# Patient Record
Sex: MALE | Race: WHITE
[De-identification: names, ages, dates, MRNs, and addresses within clinical notes are randomized per-mention and may not be internally consistent; named-entity substitution may affect disease eponyms.]

---

## 2019-01-16 ENCOUNTER — HOSPITAL ENCOUNTER (EMERGENCY)
Dept: HOSPITAL 97 - ER | Age: 44
Discharge: HOME | End: 2019-01-16
Payer: COMMERCIAL

## 2019-01-16 VITALS — OXYGEN SATURATION: 100 % | SYSTOLIC BLOOD PRESSURE: 127 MMHG | DIASTOLIC BLOOD PRESSURE: 90 MMHG | TEMPERATURE: 98.3 F

## 2019-01-16 DIAGNOSIS — H54.7: ICD-10-CM

## 2019-01-16 DIAGNOSIS — H53.9: Primary | ICD-10-CM

## 2019-01-16 LAB
BUN BLD-MCNC: 7 MG/DL (ref 7–18)
GLUCOSE SERPLBLD-MCNC: 96 MG/DL (ref 74–106)
HCT VFR BLD CALC: 43.4 % (ref 39.6–49)
LYMPHOCYTES # SPEC AUTO: 1.4 K/UL (ref 0.7–4.9)
PMV BLD: 9 FL (ref 7.6–11.3)
POTASSIUM SERPL-SCNC: 3.6 MMOL/L (ref 3.5–5.1)
RBC # BLD: 5.13 M/UL (ref 4.33–5.43)

## 2019-01-16 PROCEDURE — 99285 EMERGENCY DEPT VISIT HI MDM: CPT

## 2019-01-16 PROCEDURE — 93880 EXTRACRANIAL BILAT STUDY: CPT

## 2019-01-16 PROCEDURE — 80048 BASIC METABOLIC PNL TOTAL CA: CPT

## 2019-01-16 PROCEDURE — 81003 URINALYSIS AUTO W/O SCOPE: CPT

## 2019-01-16 PROCEDURE — 36415 COLL VENOUS BLD VENIPUNCTURE: CPT

## 2019-01-16 PROCEDURE — 70450 CT HEAD/BRAIN W/O DYE: CPT

## 2019-01-16 PROCEDURE — 82962 GLUCOSE BLOOD TEST: CPT

## 2019-01-16 PROCEDURE — 85025 COMPLETE CBC W/AUTO DIFF WBC: CPT

## 2019-01-16 PROCEDURE — 93005 ELECTROCARDIOGRAM TRACING: CPT

## 2019-01-16 NOTE — XMS REPORT
Clinical Summary

 Created on:2019



Patient:Hood Del Valle

Sex:Male

:1975

External Reference #:[id number]





Demographics







 Address  [address] 



   [place] [address]

 

 Home Phone  [phone]

 

 Email Address  [email or web address]

 

 Preferred Language  English

 

 Marital Status  

 

 Temple Affiliation  Unknown

 

 Race  White

 

 Ethnic Group  Not  or 









Author







 Organization  [facility]

 

 Address  [address] [place] [address]









Support







 Name  Relationship  Address  Phone

 

 Torri Del Valle  Parent  [address]   [phone]



     [place] [address]  









Care Team Providers







 Name  Role  Phone

 

 Perla Hagen MD  Primary Care Provider  [phone]









Allergies







 Active Allergy  Reactions  Severity  Noted Date  Comments

 

 Promethazine      2016  

 

 Propranolol  Other (See Comments)  High  2016  Drops Blood pressure.

 

 Metoclopramide Hcl  Other (See Comments)  High  2016  Suicidal Ideation.







Medications







 Medication  Sig  Dispensed  Refills  Start Date  End Date  Status

 

 ARIPiprazole (ABILIFY)  Take 5 mg by    0      Active



 5 MG tablet  mouth daily.          

 

 busPIRone (BUSPAR) 10  Take 15 mg by    0      Active



 MG tablet  mouth 2 (two)          



   times a day.          

 

 clonAZEPAM (KlonoPIN)  Take 0.5 mg by    0      Active



 0.5 MG tablet  mouth daily as          



   needed for          



   seizures.          







Active Problems

Not on file



Family History







 Medical History  Relation  Name  Comments

 

 Aortic aneurysm  Father    

 

 Atrial fibrillation  Mother    

 

 Hepatitis  Mother    "hepatitis C"

 

 Leukemia  Mother    

 

 Stroke  Sister    









 Relation  Name  Status  Comments

 

 Father      

 

 Mother      

 

 Sister      







Social History







 Tobacco Use  Types  Packs/Day  Years Used  Date

 

 Former Smoker      26  









 Comments: "did smoke a half pack to a full pack a day for 26 years"









 Sex Assigned at Birth  Date Recorded

 

 Not on file  









 Job Start Date  Occupation  Industry

 

 Not on file  Not on file  Not on file









 Travel History  Travel Start  Travel End









 No recent travel history available.







Last Filed Vital Signs

Not on file



Plan of Treatment







 Health Maintenance  Due Date  Last Done  Comments

 

 INFLUENZA VACCINE  2018    







Results

Not on fileafter 01/15/2018



Insurance







 Payer  Benefit Plan / Group  Subscriber ID  Type  Phone  Address

 

 BCBS  BCBS CHOICE PPO/FEDERAL EMPL PPO  xxxxxxxxxxxx  PPO    









 Guarantor Name  Account Type  Relation to  Date of  Phone  Billing



     Patient  Birth    Address

 

 Del Valle,Hood D  Personal/Family  Self  1975  [phone]  [address]



         (Home)  







           [place] [address]







Advance Directives

Patient has advance care planning documents on file. For more information, 
please contact:Kali Manriquez[address] [place] [address]

## 2019-01-16 NOTE — ER
Nurse's Notes                                                                                     

 [facility]                                                                

Name: Hood Del Valle                                                                              

Age: 43 yrs                                                                                       

Sex: Male                                                                                         

: 1975                                                                                   

MRN: [id number]                                                                                   

Arrival Date: 2019                                                                          

Time: 10:27                                                                                       

Account#: [id number]                                                                            

Bed 2                                                                                             

Private MD:                                                                                       

Diagnosis: Visual disturbances;Visual field defects                                               

                                                                                                  

Presentation:                                                                                     

                                                                                             

10:21 Presenting complaint: EMS states: sudden onset visual disturbance that started about    sv  

      0930 and lasted under an hour and has subsided since then. EKG SR, BS-97, 20 G R AC.        

      Transition of care: patient was not received from another setting of care. Onset of         

      symptoms was 2019 at 09:30. Risk Assessment: Do you want to hurt yourself       

      or someone else? Patient reports no desire to harm self or others. Initial Sepsis           

      Screen: Does the patient meet any 2 criteria? No. Patient's initial sepsis screen is        

      negative. Does the patient have a suspected source of infection? No. Patient's initial      

      sepsis screen is negative. Care prior to arrival: IV initiated. 20 GA, in the right         

      antecubital area, Glucose check: 97.                                                        

10:21 Method Of Arrival: EMS: [facility]                                                         sv  

10:21 Acuity: SRIDHAR 3                                                                           sv  

                                                                                                  

Historical:                                                                                       

- Allergies:                                                                                      

10:53 Reglan;                                                                                 sv  

10:53 SSRIs;                                                                                  sv  

- Home Meds:                                                                                      

10:53 Prilosec Oral [Active]; Aspirin Oral [Active];                                          sv  

- PMHx:                                                                                           

10:53 Anxiety; Depression; GERD; hiatal hernia;                                               sv  

- PSHx:                                                                                           

10:53 None;                                                                                   sv  

                                                                                                  

- Immunization history:: Adult Immunizations up to date.                                          

- Social history:: Smoking status: Patient/guardian denies using tobacco.                         

- Family history:: not pertinent.                                                                 

- Ebola Screening: : No symptoms or risks identified at this time.                                

- Hospitalizations: : No recent hospitalization is reported.                                      

                                                                                                  

                                                                                                  

Screening:                                                                                        

10:30 Abuse screen: Denies threats or abuse. Denies injuries from another. Nutritional        sv  

      screening: No deficits noted. Tuberculosis screening: No symptoms or risk factors           

      identified. Fall Risk None identified.                                                      

                                                                                                  

Assessment:                                                                                       

11:40 Reassessment: Patient appears in no apparent distress at this time. No changes from     sv  

      previously documented assessment. Patient and/or family updated on plan of care and         

      expected duration. Pain level reassessed. Patient is alert, oriented x 3, equal             

      unlabored respirations, skin warm/dry/pink.                                                 

12:29 Reassessment: Patient appears in no apparent distress at this time. No changes from     sv  

      previously documented assessment. Patient and/or family updated on plan of care and         

      expected duration. Pain level reassessed. Patient is alert, oriented x 3, equal             

      unlabored respirations, skin warm/dry/pink. Ultrasound at the bedside.                      

13:30 Reassessment: Patient appears in no apparent distress at this time. No changes from     sv  

      previously documented assessment. Patient and/or family updated on plan of care and         

      expected duration. Pain level reassessed. Patient is alert, oriented x 3, equal             

      unlabored respirations, skin warm/dry/pink.                                                 

                                                                                                  

Vital Signs:                                                                                      

10:46  / 90 Sitting; Pulse 87 MON; Resp 15 S; Temp 98.3; Pulse Ox 100% on R/A;          sg  

11:52  / 88; Pulse 69; Resp 13; Pulse Ox 97% ;                                          sv  

12:28  / 81; Pulse 79; Resp 16; Pulse Ox 100% on R/A;                                   sv  

13:08  / 85; Pulse 89; Resp 19; Pulse Ox 100% ;                                         sv  

                                                                                                  

ED Course:                                                                                        

10:21 Maintain EMS IV. Dressing intact. Good blood return noted. Site clean \T\ dry. Gauge \T\    sv

      site: 20G R AC.                                                                             

10:27 Patient arrived in ED.                                                                  rn  

10:28 Christian Howe MD is Attending Physician.                                                rn  

10:29 Janessa Torres, RN is Primary Nurse.                                                  sv  

10:30 Initial lab(s) drawn, by ED staff, sent to lab.                                         sv  

10:30 Arm band placed on.                                                                     sv  

10:30 Patient has correct armband on for positive identification. Placed in gown. Bed in low  sv  

      position. Call light in reach. Side rails up X2. Cardiac monitor on. Pulse ox on. NIBP      

      on. Door closed. Head of bed elevated.                                                      

10:43 Triage completed.                                                                       sv  

10:44 EKG done, by EKG tech. reviewed by Christian Howe MD.                                      sm3 

10:51 CT completed. Patient tolerated procedure well. Patient moved to CT via wheelchair.     sj  

      Patient moved back from CT.                                                                 

10:52 CT Head Brain wo Cont In Process Unspecified.                                           EDMS

11:30 Urine collected: clean catch specimen, clear, woody colored.                            jb1 

12:34 Carotid Artery Bilateral US In Process Unspecified.                                     EDMS

12:34 Note: us done bedside in er/completed.                                                  lc3 

13:25 Andrew Gordillo MD is Referral Physician.                                             rn  

13:31 No provider procedures requiring assistance completed. IV discontinued, intact,         ss  

      bleeding controlled, No redness/swelling at site. Pressure dressing applied.                

                                                                                                  

Administered Medications:                                                                         

No medications were administered                                                                  

                                                                                                  

                                                                                                  

Point of Care Testing:                                                                            

      Blood Glucose:                                                                              

10:46 Blood Glucose: 98 mg/dL;                                                                sg  

      Ranges:                                                                                     

                                                                                                  

Outcome:                                                                                          

13:25 Discharge ordered by MD.                                                                rn  

13:31 Discharged to home ambulatory.                                                          ss  

13:31 Condition: good                                                                             

13:31 Discharge instructions given to patient, Instructed on discharge instructions, follow       

      up and referral plans. Demonstrated understanding of instructions, follow-up care.          

13:32 Patient left the ED.                                                                    ss  

                                                                                                  

Signatures:                                                                                       

Dispatcher [employer]                           EDMS                                                 

John Dietrich                                 jb1                                                  

Janessa Torres, RN                    RN   Saúl Tejada RN RN sg Jones, Susan sj Nieto, Roman, MD MD rn Smirch, Shelby, RN RN                                                      

Maykel Torres Shakira                              3                                                  

                                                                                                  

**************************************************************************************************

## 2019-01-16 NOTE — RAD REPORT
EXAM DESCRIPTION:  CT - Head Brain Wo Cont - 1/16/2019 10:52 am

 

CLINICAL HISTORY:  visual disturbance, left temporal

Headache, history of TIA/CVA

 

COMPARISON:  Ct Stroke Brain Wo Cont dated 11/17/2017; HEAD BRAIN W O CONTRAST dated 4/25/2012; Brain
 Wo Cont dated 11/17/2017

 

TECHNIQUE:  All CT scans are performed using dose optimization technique as appropriate and may inclu
de automated exposure control or mA/KV adjustment according to patient size.

 

FINDINGS:  No intracranial hemorrhage, hydrocephalus or extra-axial fluid collection.No areas of brai
n edema or evidence of midline shift.

 

The paranasal sinuses and mastoids are clear. The calvarium is intact.

 

IMPRESSION:  No acute intracranial abnormality. 10-Sep-2018 02:30

## 2019-01-16 NOTE — RAD REPORT
EXAM DESCRIPTION:   - CP - 1/16/2019 12:48 pm

 

CLINICAL HISTORY:  VISUAL DISTURBANCES

TIA/CVA

 

COMPARISON:  No comparisons

 

TECHNIQUE:  Real-time sonographic evaluation of both carotid systems was performed. Doppler interroga
tion was performed with waveform tracing bilaterally.

 

FINDINGS:  Normal high resistance waveforms are noted in both external carotid arteries. The common c
arotid arteries and internal carotid arteries show normal low resistance waveforms.

 

No significant plaque formation is seen. Peak systolic and end diastolic velocity values and the ICA/
CCA ratios are in the non-hemodynamically significant range.

 

Antegrade flow seen in both vertebral arteries.

 

IMPRESSION:  No significant atherosclerotic changes noted.

 

No evidence of a hemodynamically significant stenosis.

## 2019-01-16 NOTE — EDPHYS
Physician Documentation                                                                           

 [facility]                                                                

Name: Hood Del Valle                                                                              

Age: 43 yrs                                                                                       

Sex: Male                                                                                         

: 1975                                                                                   

MRN: [id number]                                                                                   

Arrival Date: 2019                                                                          

Time: 10:27                                                                                       

Account#: [id number]                                                                            

Bed 2                                                                                             

Private MD:                                                                                       

ED Physician Christian Howe                                                                         

HPI:                                                                                              

                                                                                             

10:39 This 43 yrs old  Male presents to ER via Unassigned with complaints of Loss Of rn  

      Vision.                                                                                     

10:39 Onset: The symptoms/episode began/occurred 1 hour(s) ago. Duration: the symptoms last 1 rn  

      hour(s). Aggravated by nothing. Alleviated by nothing. Severity of symptoms: At their       

      worst the symptoms were mild in the emergency department the symptoms have resolved.        

      The patient has not experienced similar symptoms in the past. The patient has not           

      recently seen a physician. Reports driving to work, noticed loss of vision left             

      temporal side, thinks both eyes because tested them, similar episode in past, told had      

      TIA, now symptoms resolved after 1 hour..                                                   

                                                                                                  

Historical:                                                                                       

- Allergies:                                                                                      

10:53 Reglan;                                                                                 sv  

10:53 SSRIs;                                                                                  sv  

- Home Meds:                                                                                      

10:53 Prilosec Oral [Active]; Aspirin Oral [Active];                                          sv  

- PMHx:                                                                                           

10:53 Anxiety; Depression; GERD; hiatal hernia;                                               sv  

- PSHx:                                                                                           

10:53 None;                                                                                   sv  

                                                                                                  

- Immunization history:: Adult Immunizations up to date.                                          

- Social history:: Smoking status: Patient/guardian denies using tobacco.                         

- Family history:: not pertinent.                                                                 

- Ebola Screening: : No symptoms or risks identified at this time.                                

- Hospitalizations: : No recent hospitalization is reported.                                      

                                                                                                  

                                                                                                  

ROS:                                                                                              

10:39 Constitutional: Negative for fever, chills, and weight loss, Eyes: Negative for injury, rn  

      pain, redness, and discharge, Neck: Negative for injury, pain, and swelling,                

      Cardiovascular: Negative for chest pain, palpitations, and edema, Respiratory: Negative     

      for shortness of breath, cough, wheezing, and pleuritic chest pain, Abdomen/GI:             

      Negative for abdominal pain, nausea, vomiting, diarrhea, and constipation,                  

      MS/Extremity: Negative for injury and deformity, Skin: Negative for injury, rash, and       

      discoloration, Neuro: Negative for headache, weakness, numbness, tingling, and seizure.     

                                                                                                  

Exam:                                                                                             

10:38 ECG was reviewed by the Attending Physician.                                            rn  

10:39 Visual Acuity: I have reviewed the nursing documentation. Visual acuity is within       rn  

      normal limits.                                                                              

10:39 Constitutional:  This is a well developed, well nourished patient who is awake, alert,      

      and in no acute distress. Head/Face:  Normocephalic, atraumatic. Eyes:  Pupils equal        

      round and reactive to light, extra-ocular motions intact.  Lids and lashes normal.          

      Conjunctiva and sclera are non-icteric and not injected.  Cornea within normal limits.      

      Periorbital areas with no swelling, redness, or edema. Cardiovascular:  Regular rate        

      and rhythm, No pulse deficits. Respiratory:  Lungs have equal breath sounds                 

      bilaterally, clear to auscultation Abdomen/GI:  Soft, non-tender, with normal bowel         

      sounds.  No distension or tympany.  No guarding or rebound.  No evidence of tenderness      

      throughout. MS/ Extremity:  Pulses equal, no cyanosis.  Neurovascular intact.  Full,        

      normal range of motion.  Equal circumference. Neuro:  Awake and alert, GCS 15, oriented     

      to person, place, time, and situation.  Cranial nerves II-XII grossly intact.  Motor        

      strength 5/5 in all extremities.  Sensory grossly intact.  Cerebellar exam normal.          

                                                                                                  

Vital Signs:                                                                                      

10:46  / 90 Sitting; Pulse 87 MON; Resp 15 S; Temp 98.3; Pulse Ox 100% on R/A;          sg  

11:52  / 88; Pulse 69; Resp 13; Pulse Ox 97% ;                                          sv  

12:28  / 81; Pulse 79; Resp 16; Pulse Ox 100% on R/A;                                   sv  

13:08  / 85; Pulse 89; Resp 19; Pulse Ox 100% ;                                         sv  

                                                                                                  

MDM:                                                                                              

10:28 Patient medically screened.                                                             rn  

13:23 Differential diagnosis: TIA, primary ocular problem. Data reviewed: vital signs, nurses rn  

      notes.                                                                                      

13:23 Counseling: I had a detailed discussion with the patient and/or guardian regarding: the rn  

      historical points, exam findings, and any diagnostic results supporting the                 

      discharge/admit diagnosis, lab results, radiology results, the need for outpatient          

      follow up, to return to the emergency department if symptoms worsen or persist or if        

      there are any questions or concerns that arise at home. Special discussion: I discussed     

      with the patient/guardian in detail that at this point there is no indication for           

      admission to the hospital. It is understood, however, that if the symptoms persist or       

      worsen the patient needs to return immediately for re-evaluation. Based on the history      

      and exam findings, there is no indication for further emergent testing or inpatient         

      evaluation. I discussed with the patient/guardian the need to see the neurologist for       

      further evaluation of the symptoms.                                                         

13:23 ED course: Pt requests to go home, does not want to be admitted, recommended            rn  

      observation with re-workup, patient states would rather do outpt w/u. Will continue         

      aspirin. Ans needs to f/u with neuro. Has seen dr. escoto. .                              

                                                                                                  

                                                                                             

10:30 Order name: CBC with Diff; Complete Time: 11:                                         rn  

                                                                                             

10:30 Order name: Basic Metabolic Panel; Complete Time: 11:                                 rn  

                                                                                             

10:30 Order name: CT Head Brain wo Cont; Complete Time: 11:                                 rn  

                                                                                             

10:30 Order name: EKG; Complete Time: 10:31                                                   rn  

                                                                                             

11:26 Order name: Urine Dipstick--Ancillary (enter results); Complete Time: 13:22             em1 

                                                                                             

11:31 Order name: Carotid Artery Bilateral US; Complete Time: 13:22                           rn  

                                                                                             

10:30 Order name: IV Start; Complete Time: 10:41                                              rn  

                                                                                             

10:30 Order name: EKG - Nurse/Tech; Complete Time: 12:02                                      rn  

                                                                                             

10:31 Order name: Glucose Level; Complete Time: 12:02                                         rn  

                                                                                                  

ECG:                                                                                              

10:38 Rate is 82 beats/min. Rhythm is regular. QRS Axis is Normal. AL interval is normal. QRS rn  

      interval is normal. QT interval is normal. No Q waves. T waves are Normal. No ST            

      changes noted. Clinical impression: Normal ECG. Interpreted by me.                          

                                                                                                  

Administered Medications:                                                                         

No medications were administered                                                                  

                                                                                                  

                                                                                                  

Point of Care Testing:                                                                            

      Blood Glucose:                                                                              

10:46 Blood Glucose: 98 mg/dL;                                                                sg  

      Ranges:                                                                                     

      Critical Glucose Levels:Adult <50 mg/dl or >400 mg/dl  <40 mg/dl or >180 mg/dl       

Disposition:                                                                                      

19 13:25 Discharged to Home. Impression: Visual disturbances, Visual field defects.         

- Condition is Stable.                                                                            

- Discharge Instructions: Visual Disturbances, Transient Ischemic Attack.                         

                                                                                                  

- Work release form, Medication Reconciliation Form, Thank You Letter, Antibiotic                 

  Education, Prescription Opioid Use form.                                                        

- Follow up: Andrew Escoto MD; When: As needed; Reason: Recheck today's complaints,           

  Re-evaluation by your physician.                                                                

- Problem is new.                                                                                 

- Symptoms have improved.                                                                         

                                                                                                  

                                                                                                  

                                                                                                  

Signatures:                                                                                       

Dispatcher [employer]                           Janessa Marte RN                    RN   Christian Hansen MD MD rn Smirch, Shelby, RN RN   ss                                                   

                                                                                                  

Corrections: (The following items were deleted from the chart)                                    

13:32 13:25 2019 13:25 Discharged to Home. Impression: Visual disturbances; Visual      ss  

      field defects. Condition is Stable. Forms are Medication Reconciliation Form, Thank You     

      Letter, Antibiotic Education, Prescription Opioid Use. Follow up: Andrew Escoto;          

      When: As needed; Reason: Recheck today's complaints, Re-evaluation by your physician.       

      Problem is new. Symptoms have improved. rn                                                  

                                                                                                  

**************************************************************************************************

## 2019-01-16 NOTE — EKG
Test Date:    2019-01-16               Test Time:    10:34:47

Technician:   FAISAL                                     

                                                     

MEASUREMENT RESULTS:                                       

Intervals:                                           

Rate:         82                                     

OH:           168                                    

QRSD:         104                                    

QT:           350                                    

QTc:          408                                    

Axis:                                                

P:            60                                     

OH:           168                                    

QRS:          78                                     

T:            73                                     

                                                     

INTERPRETIVE STATEMENTS:                                       

                                                     

Normal sinus rhythm

Normal ECG

Compared to ECG 11/17/2017 12:16:00

Sinus arrhythmia no longer present



Electronically Signed On 01-16-19 14:00:24 CST by Bryan Crouch